# Patient Record
Sex: MALE | Race: WHITE | Employment: OTHER | URBAN - METROPOLITAN AREA
[De-identification: names, ages, dates, MRNs, and addresses within clinical notes are randomized per-mention and may not be internally consistent; named-entity substitution may affect disease eponyms.]

---

## 2019-05-14 ENCOUNTER — OFFICE VISIT (OUTPATIENT)
Dept: ENT CLINIC | Age: 59
End: 2019-05-14
Payer: MEDICARE

## 2019-05-14 VITALS — DIASTOLIC BLOOD PRESSURE: 82 MMHG | SYSTOLIC BLOOD PRESSURE: 133 MMHG | HEART RATE: 80 BPM

## 2019-05-14 DIAGNOSIS — H61.23 BILATERAL IMPACTED CERUMEN: Primary | ICD-10-CM

## 2019-05-14 PROCEDURE — 69210 REMOVE IMPACTED EAR WAX UNI: CPT | Performed by: OTOLARYNGOLOGY

## 2019-12-09 ENCOUNTER — OFFICE VISIT (OUTPATIENT)
Dept: ENT CLINIC | Age: 59
End: 2019-12-09
Payer: MEDICARE

## 2019-12-09 VITALS
DIASTOLIC BLOOD PRESSURE: 73 MMHG | HEART RATE: 95 BPM | WEIGHT: 220 LBS | SYSTOLIC BLOOD PRESSURE: 104 MMHG | BODY MASS INDEX: 29.84 KG/M2 | TEMPERATURE: 97 F

## 2019-12-09 DIAGNOSIS — H61.23 BILATERAL IMPACTED CERUMEN: Primary | ICD-10-CM

## 2019-12-09 PROCEDURE — 69210 REMOVE IMPACTED EAR WAX UNI: CPT | Performed by: OTOLARYNGOLOGY

## 2020-09-01 ENCOUNTER — OFFICE VISIT (OUTPATIENT)
Dept: ENT CLINIC | Age: 60
End: 2020-09-01
Payer: MEDICARE

## 2020-09-01 VITALS
SYSTOLIC BLOOD PRESSURE: 134 MMHG | BODY MASS INDEX: 29.84 KG/M2 | TEMPERATURE: 98.1 F | DIASTOLIC BLOOD PRESSURE: 83 MMHG | HEART RATE: 103 BPM | WEIGHT: 220 LBS

## 2020-09-01 PROCEDURE — 69210 REMOVE IMPACTED EAR WAX UNI: CPT | Performed by: OTOLARYNGOLOGY

## 2020-09-01 NOTE — PROGRESS NOTES
Here for cerumen removal.     Cerumen  Pre operative diagnosis: Cerumen impaction bilaterally  Post operative diagnosis: Same  Procedure: bilaterally cerumenectomy    After consent an operating microscope was utilized to visualize the external auditory canals bilaterally. Cerumen was removed with curettes and simpson suctions on the both sides. The tympanic membrane is intact. No fluid visualized in the middle ear. No complications. I attest I performed the entire procedure myself. Follow up in one year.

## 2021-09-09 ENCOUNTER — OFFICE VISIT (OUTPATIENT)
Dept: ENT CLINIC | Age: 61
End: 2021-09-09
Payer: MEDICARE

## 2021-09-09 VITALS
HEART RATE: 93 BPM | SYSTOLIC BLOOD PRESSURE: 124 MMHG | WEIGHT: 210 LBS | TEMPERATURE: 97.9 F | BODY MASS INDEX: 28.44 KG/M2 | HEIGHT: 72 IN | DIASTOLIC BLOOD PRESSURE: 83 MMHG

## 2021-09-09 DIAGNOSIS — H61.23 BILATERAL IMPACTED CERUMEN: Primary | ICD-10-CM

## 2021-09-09 PROCEDURE — 69210 REMOVE IMPACTED EAR WAX UNI: CPT | Performed by: OTOLARYNGOLOGY

## 2021-09-09 NOTE — PROGRESS NOTES
Patient is following up for cerumen impaction. He has no other complaints today. Procedure  Bilateral ear exam and cerumen removal  Right ears visualized binoculars scope. A dry cerumen impaction was removed with a combination of a right angle and alligator forceps. Underlying tympanic membrane intact and aerated middle ear    On the left side again a dry cerumen impaction was removed with a combination of a right angle and alligator forceps. Underlying tympanic membrane intact and aerated middle ear. Plan  Cerumen was removed.   Follow-up in 1 year

## 2023-03-02 ENCOUNTER — OFFICE VISIT (OUTPATIENT)
Dept: ENT CLINIC | Age: 63
End: 2023-03-02
Payer: MEDICARE

## 2023-03-02 VITALS — DIASTOLIC BLOOD PRESSURE: 78 MMHG | SYSTOLIC BLOOD PRESSURE: 125 MMHG | HEART RATE: 102 BPM | OXYGEN SATURATION: 94 %

## 2023-03-02 DIAGNOSIS — H61.23 BILATERAL IMPACTED CERUMEN: Primary | ICD-10-CM

## 2023-03-02 PROCEDURE — 69210 REMOVE IMPACTED EAR WAX UNI: CPT | Performed by: OTOLARYNGOLOGY

## 2023-03-02 NOTE — PROGRESS NOTES
Patient is following up for cerumen impactions. I last saw him in September 2021. He denies any ear pain or other new problems. Bilateral ear exam with cerumen removal  The right ear was visualized binocular scope. The patient did have a severe cerumen impaction removed with a combination of a right angle and alligator forceps. Tympanic membrane was intact with an aerated middle ear    On the left side he again had a severe cerumen impaction that removed with alligator forceps and a right angle. Tympanic membrane was intact with an aerated middle ear    Plan  The patient did have a significant out of wax today. I recommended 1 year follow-up for repeat cleaning.

## 2024-01-29 NOTE — PROGRESS NOTES
[facility]      Cardiology Consult    Rosendo Gomez  1960 January 30, 2024    Referring Physician: No primary care provider on file.  Reason for Referral: \"dilated sinus of valsalva\"    CC: \"I feel fine.\"    HPI:  The patient is 63 y.o. male with a past medical history significant for hyperlipidemia, schizophrenia, and intellectual disability who lives in a group home and was referred for unclear reasons. The consult says he has a dilated sinus of valsalva but his group home  says he is here for evaluation of tachycardia. The patient says he is feeling well and denies any cardiac sounding complaints. He and the  deny any recent hospitalizations. The manager thinks he had a chest Xray recently but no records are available. He reports medication compliance and is tolerating. He denies any abnormal bleeding or bruising. He denies exertional chest pain/pressure, dyspnea at rest, worsening LOPEZ, PND, orthopnea, palpitations, lightheadedness, weight changes, LE edema, and syncope.    Past Medical History:   Diagnosis Date    Mental retardation     Schizophrenia (HCC)      History reviewed. No pertinent surgical history.  Family History   Family history unknown: Yes     Social History     Tobacco Use    Smoking status: Never    Smokeless tobacco: Never   Vaping Use    Vaping Use: Never used   Substance Use Topics    Alcohol use: No     Alcohol/week: 0.0 standard drinks of alcohol    Drug use: Never     No Known Allergies  Current Outpatient Medications   Medication Sig Dispense Refill    folic acid (FOLVITE) 1 MG tablet       lisinopril (PRINIVIL;ZESTRIL) 20 MG tablet       nystatin (MYCOSTATIN) POWD powder Apply topically 2 times daily cream      mupirocin (BACTROBAN) 2 % ointment Apply topically 3 times daily Apply topically 3 times daily.      amitriptyline (ELAVIL) 25 MG tablet       benztropine (COGENTIN) 1 MG tablet       risperiDONE (RISPERDAL) 4 MG tablet 6mg

## 2024-01-30 ENCOUNTER — OFFICE VISIT (OUTPATIENT)
Dept: CARDIOLOGY CLINIC | Age: 64
End: 2024-01-30
Payer: MEDICARE

## 2024-01-30 ENCOUNTER — TELEPHONE (OUTPATIENT)
Dept: CARDIOLOGY CLINIC | Age: 64
End: 2024-01-30

## 2024-01-30 VITALS
WEIGHT: 209.8 LBS | DIASTOLIC BLOOD PRESSURE: 84 MMHG | BODY MASS INDEX: 28.42 KG/M2 | SYSTOLIC BLOOD PRESSURE: 128 MMHG | HEIGHT: 72 IN | OXYGEN SATURATION: 98 % | HEART RATE: 110 BPM

## 2024-01-30 DIAGNOSIS — R00.0 SINUS TACHYCARDIA: ICD-10-CM

## 2024-01-30 DIAGNOSIS — E78.2 MIXED HYPERLIPIDEMIA: ICD-10-CM

## 2024-01-30 DIAGNOSIS — Q25.49 DILATATION OF AORTIC SINUS OF VALSALVA: Primary | ICD-10-CM

## 2024-01-30 PROCEDURE — G8484 FLU IMMUNIZE NO ADMIN: HCPCS | Performed by: INTERNAL MEDICINE

## 2024-01-30 PROCEDURE — 1036F TOBACCO NON-USER: CPT | Performed by: INTERNAL MEDICINE

## 2024-01-30 PROCEDURE — 99204 OFFICE O/P NEW MOD 45 MIN: CPT | Performed by: INTERNAL MEDICINE

## 2024-01-30 PROCEDURE — 93000 ELECTROCARDIOGRAM COMPLETE: CPT | Performed by: INTERNAL MEDICINE

## 2024-01-30 PROCEDURE — 3017F COLORECTAL CA SCREEN DOC REV: CPT | Performed by: INTERNAL MEDICINE

## 2024-01-30 PROCEDURE — G8428 CUR MEDS NOT DOCUMENT: HCPCS | Performed by: INTERNAL MEDICINE

## 2024-01-30 PROCEDURE — G8419 CALC BMI OUT NRM PARAM NOF/U: HCPCS | Performed by: INTERNAL MEDICINE

## 2024-01-30 RX ORDER — NYSTATIN 10B UNIT
POWDER (EA) MISCELLANEOUS 2 TIMES DAILY
COMMUNITY

## 2024-01-30 RX ORDER — FOLIC ACID 1 MG/1
TABLET ORAL
COMMUNITY
Start: 2024-01-12

## 2024-01-30 RX ORDER — LISINOPRIL 20 MG/1
TABLET ORAL
COMMUNITY
Start: 2024-01-12

## 2024-01-30 NOTE — TELEPHONE ENCOUNTER
Called office number of PCP Dr. Milena Walker and the [place] office stated this physician has been gone awhile from that location. Gave PT's name and they were unable to pull up PT's chart. Representative states she doesn't know where Dr. Srinivasa Walker is practicing now. She was unable to provide me with the necessary documents.     Did a search for other locations, possibly [facility]. Called them and  stated this physician doesn't practice there either.

## 2024-01-30 NOTE — TELEPHONE ENCOUNTER
Please obtain documentation/ imaging/testing in regards to dilated sinus valsalva and tachycardia from PCP Dr. Milena Walker at [id number] [id number].    Phone: [phone]  Fax: [phone]    Thank you

## 2024-01-31 NOTE — TELEPHONE ENCOUNTER
Left message for Amari the  to return call to office to obtain contact information for Dr. Bernabe.

## 2024-02-01 NOTE — TELEPHONE ENCOUNTER
Spoke with Terrie () she states that Dr. Milena Walker works with [facility] now and phone # is [phone].    Please obtain documentation/ imaging/testing in regards to dilated sinus valsalva and tachycardia.    Thank you

## 2024-02-28 ENCOUNTER — HOSPITAL ENCOUNTER (OUTPATIENT)
Dept: CT IMAGING | Age: 64
Discharge: HOME | End: 2024-02-28
Attending: INTERNAL MEDICINE
Payer: MEDICARE

## 2024-02-28 DIAGNOSIS — Q25.49 DILATATION OF AORTIC SINUS OF VALSALVA: ICD-10-CM

## 2024-02-28 PROCEDURE — 71250 CT THORAX DX C-: CPT

## 2024-07-10 ENCOUNTER — OFFICE VISIT (OUTPATIENT)
Dept: ENT CLINIC | Age: 64
End: 2024-07-10
Payer: MEDICARE

## 2024-07-10 VITALS
BODY MASS INDEX: 26.82 KG/M2 | SYSTOLIC BLOOD PRESSURE: 134 MMHG | OXYGEN SATURATION: 95 % | WEIGHT: 198 LBS | DIASTOLIC BLOOD PRESSURE: 86 MMHG | HEART RATE: 100 BPM | HEIGHT: 72 IN

## 2024-07-10 DIAGNOSIS — H61.23 BILATERAL IMPACTED CERUMEN: Primary | ICD-10-CM

## 2024-07-10 PROCEDURE — 69210 REMOVE IMPACTED EAR WAX UNI: CPT | Performed by: OTOLARYNGOLOGY

## 2024-07-10 NOTE — PROGRESS NOTES
Patient is following up for cerumen impactions.  I have not seen him since March 2023.  They feel little bit plugged.    Procedure  Bilateral ear exam and cerumen removal.  The right ear was visualized binocular scope.  A cerumen impaction was removed with a combination of a Bernabe suction and alligator forceps.  Tympanic membrane intact with aerated middle ear    On the left side a cerumen impaction was removed the Bernabe suction and alligator forceps.  Tympanic membrane intact with aerated middle ear.    Plan  Cerumen was removed today in clinic.  He is in need of a 6-month follow-up visit as his it was pretty extensive.

## 2024-12-19 ENCOUNTER — OFFICE VISIT (OUTPATIENT)
Dept: ENT CLINIC | Age: 64
End: 2024-12-19
Payer: MEDICARE

## 2024-12-19 VITALS
HEIGHT: 72 IN | DIASTOLIC BLOOD PRESSURE: 89 MMHG | HEART RATE: 93 BPM | SYSTOLIC BLOOD PRESSURE: 148 MMHG | OXYGEN SATURATION: 96 % | WEIGHT: 209 LBS | BODY MASS INDEX: 28.31 KG/M2

## 2024-12-19 DIAGNOSIS — H61.23 BILATERAL IMPACTED CERUMEN: Primary | ICD-10-CM

## 2024-12-19 PROCEDURE — 69210 REMOVE IMPACTED EAR WAX UNI: CPT | Performed by: OTOLARYNGOLOGY

## 2024-12-19 NOTE — PROGRESS NOTES
Patient is following up for cerumen impactions.  I saw him in July.  He does not have any other complaints today.    Procedure  Bilateral ear exam and cerumen removal.  Right ear was visualized with a binocular scope.  He had a dense hearing impaction removed with alligator forceps.  Tympanic membrane intact aerated middle ear pinna left side he again had a dense cerumen impaction I removed with alligator forceps.  Tympanic membrane intact with aerated middle ear.    Plan  Patient did have pretty bad cerumen impactions.  I think he should have a 6-month follow-up visit.

## 2025-06-19 ENCOUNTER — OFFICE VISIT (OUTPATIENT)
Dept: ENT CLINIC | Age: 65
End: 2025-06-19
Payer: MEDICARE

## 2025-06-19 VITALS
HEIGHT: 72 IN | SYSTOLIC BLOOD PRESSURE: 123 MMHG | DIASTOLIC BLOOD PRESSURE: 86 MMHG | BODY MASS INDEX: 26.55 KG/M2 | WEIGHT: 196 LBS

## 2025-06-19 DIAGNOSIS — H61.23 BILATERAL IMPACTED CERUMEN: Primary | ICD-10-CM

## 2025-06-19 PROCEDURE — 69210 REMOVE IMPACTED EAR WAX UNI: CPT | Performed by: OTOLARYNGOLOGY

## 2025-06-19 NOTE — PROGRESS NOTES
Patient is following up for cerumen impactions.  No other complaints today.    Procedure  Bilateral ear exam with cerumen removal  Right ear was visualized with binoculars ago.  A cerumen impaction was removed with a right angle.  Tympanic membrane was intact with aerated middle ear.  On the left side a cerumen impaction was removed a right angle.  Tympanic membrane intact with aerated middle ear.    Plan  We will continue her 6-month follow-up visits for the cerumen.